# Patient Record
Sex: FEMALE | Race: BLACK OR AFRICAN AMERICAN | NOT HISPANIC OR LATINO | Employment: UNEMPLOYED | URBAN - METROPOLITAN AREA
[De-identification: names, ages, dates, MRNs, and addresses within clinical notes are randomized per-mention and may not be internally consistent; named-entity substitution may affect disease eponyms.]

---

## 2017-02-23 ENCOUNTER — TELEPHONE (OUTPATIENT)
Dept: OBSTETRICS AND GYNECOLOGY | Facility: CLINIC | Age: 37
End: 2017-02-23

## 2017-02-23 NOTE — TELEPHONE ENCOUNTER
----- Message from Joann Harris sent at 2/23/2017  3:28 PM CST -----  Refill: nifedipine 30 MG ORAL TR24 (PROCARDIA-XL) 30 MG (OSM) 24 hr tablet     Please send to [facility] on [address]. Patient can be reached at [phone] thank you!      02/23/2017 4:01 PM  Spoke with pt and informed her that  stated that she needs to see her primary care for Procardia . Pt then yelled and stated she just needs him to refll it in then stated again that he will not refill it and she needs too see her primary care, pt stated OK! and hung up.

## 2017-05-12 ENCOUNTER — LAB VISIT (OUTPATIENT)
Dept: LAB | Facility: HOSPITAL | Age: 37
End: 2017-05-12
Attending: FAMILY MEDICINE
Payer: MEDICARE

## 2017-05-12 ENCOUNTER — OFFICE VISIT (OUTPATIENT)
Dept: FAMILY MEDICINE | Facility: CLINIC | Age: 37
End: 2017-05-12
Payer: MEDICARE

## 2017-05-12 VITALS
SYSTOLIC BLOOD PRESSURE: 130 MMHG | TEMPERATURE: 99 F | HEART RATE: 105 BPM | DIASTOLIC BLOOD PRESSURE: 90 MMHG | OXYGEN SATURATION: 98 % | WEIGHT: 248 LBS | RESPIRATION RATE: 16 BRPM | BODY MASS INDEX: 42.34 KG/M2 | HEIGHT: 64 IN

## 2017-05-12 DIAGNOSIS — B30.9 VIRAL CONJUNCTIVITIS: ICD-10-CM

## 2017-05-12 DIAGNOSIS — F25.1 SCHIZOAFFECTIVE DISORDER, DEPRESSIVE TYPE: ICD-10-CM

## 2017-05-12 DIAGNOSIS — E66.01 MORBID OBESITY WITH BMI OF 40.0-44.9, ADULT: ICD-10-CM

## 2017-05-12 DIAGNOSIS — I10 UNCONTROLLED HYPERTENSION: ICD-10-CM

## 2017-05-12 DIAGNOSIS — I10 UNCONTROLLED HYPERTENSION: Primary | ICD-10-CM

## 2017-05-12 LAB
ALBUMIN SERPL BCP-MCNC: 4 G/DL
ALP SERPL-CCNC: 72 U/L
ALT SERPL W/O P-5'-P-CCNC: 12 U/L
ANION GAP SERPL CALC-SCNC: 12 MMOL/L
AST SERPL-CCNC: 16 U/L
BASOPHILS # BLD AUTO: 0.02 K/UL
BASOPHILS NFR BLD: 0.3 %
BILIRUB SERPL-MCNC: 0.5 MG/DL
BUN SERPL-MCNC: 15 MG/DL
CALCIUM SERPL-MCNC: 9.7 MG/DL
CHLORIDE SERPL-SCNC: 103 MMOL/L
CHOLEST/HDLC SERPL: 2.6 {RATIO}
CO2 SERPL-SCNC: 23 MMOL/L
CREAT SERPL-MCNC: 0.9 MG/DL
DIFFERENTIAL METHOD: ABNORMAL
EOSINOPHIL # BLD AUTO: 0.1 K/UL
EOSINOPHIL NFR BLD: 1.6 %
ERYTHROCYTE [DISTWIDTH] IN BLOOD BY AUTOMATED COUNT: 13.2 %
EST. GFR  (AFRICAN AMERICAN): >60 ML/MIN/1.73 M^2
EST. GFR  (NON AFRICAN AMERICAN): >60 ML/MIN/1.73 M^2
GLUCOSE SERPL-MCNC: 72 MG/DL
HCT VFR BLD AUTO: 42.4 %
HDL/CHOLESTEROL RATIO: 37.8 %
HDLC SERPL-MCNC: 193 MG/DL
HDLC SERPL-MCNC: 73 MG/DL
HGB BLD-MCNC: 13.9 G/DL
LDLC SERPL CALC-MCNC: 107.4 MG/DL
LYMPHOCYTES # BLD AUTO: 2 K/UL
LYMPHOCYTES NFR BLD: 30.5 %
MCH RBC QN AUTO: 30.9 PG
MCHC RBC AUTO-ENTMCNC: 32.8 %
MCV RBC AUTO: 94 FL
MONOCYTES # BLD AUTO: 0.2 K/UL
MONOCYTES NFR BLD: 3.7 %
NEUTROPHILS # BLD AUTO: 4.1 K/UL
NEUTROPHILS NFR BLD: 63.7 %
NONHDLC SERPL-MCNC: 120 MG/DL
PLATELET # BLD AUTO: 272 K/UL
PMV BLD AUTO: 10.4 FL
POTASSIUM SERPL-SCNC: 4 MMOL/L
PROT SERPL-MCNC: 7.9 G/DL
RBC # BLD AUTO: 4.5 M/UL
SODIUM SERPL-SCNC: 138 MMOL/L
TRIGL SERPL-MCNC: 63 MG/DL
WBC # BLD AUTO: 6.43 K/UL

## 2017-05-12 PROCEDURE — 36415 COLL VENOUS BLD VENIPUNCTURE: CPT | Mod: PO

## 2017-05-12 PROCEDURE — 80053 COMPREHEN METABOLIC PANEL: CPT

## 2017-05-12 PROCEDURE — 85025 COMPLETE CBC W/AUTO DIFF WBC: CPT

## 2017-05-12 PROCEDURE — 99214 OFFICE O/P EST MOD 30 MIN: CPT | Mod: S$PBB,,, | Performed by: FAMILY MEDICINE

## 2017-05-12 PROCEDURE — 99999 PR PBB SHADOW E&M-EST. PATIENT-LVL III: CPT | Mod: PBBFAC,,, | Performed by: FAMILY MEDICINE

## 2017-05-12 PROCEDURE — 80061 LIPID PANEL: CPT

## 2017-05-12 PROCEDURE — 99499 UNLISTED E&M SERVICE: CPT | Mod: S$PBB,,, | Performed by: FAMILY MEDICINE

## 2017-05-12 RX ORDER — DOCUSATE SODIUM 100 MG/1
100 CAPSULE, LIQUID FILLED ORAL 2 TIMES DAILY PRN
Qty: 60 CAPSULE | Refills: 0 | Status: CANCELLED | OUTPATIENT
Start: 2017-05-12 | End: 2017-08-10

## 2017-05-12 RX ORDER — NIFEDIPINE 30 MG/1
30 TABLET, EXTENDED RELEASE ORAL DAILY
Qty: 90 TABLET | Refills: 0 | Status: SHIPPED | OUTPATIENT
Start: 2017-05-12 | End: 2017-05-24 | Stop reason: SDUPTHER

## 2017-05-12 NOTE — PROGRESS NOTES
Subjective:       Patient ID: Ester Guillen is a 36 y.o. female.    Chief Complaint: Hypertension (follow up ) and Eye Problem (left eye redness)    Hypertension   This is a new problem. The current episode started more than 1 month ago. The problem is unchanged. The problem is controlled. Associated symptoms include chest pain. Pertinent negatives include no anxiety, blurred vision, headaches, malaise/fatigue, neck pain, orthopnea, palpitations, peripheral edema, PND, shortness of breath or sweats. There are no associated agents to hypertension. Risk factors for coronary artery disease include sedentary lifestyle. Past treatments include nothing. The current treatment provides no improvement. There are no compliance problems.    Patient with gestational HTN.  Took nifedipine x 1 month.  Also with redness of right eye x several days.  No eye pain or drainage.  URI 1 month ago.  Review of Systems   Constitutional: Negative for malaise/fatigue.   Eyes: Negative for blurred vision, photophobia, pain, discharge, itching and visual disturbance.   Respiratory: Negative for shortness of breath.    Cardiovascular: Positive for chest pain. Negative for palpitations, orthopnea and PND.   Musculoskeletal: Negative for neck pain.   Neurological: Negative for headaches.       Objective:    /90  Physical Exam   Constitutional: She is oriented to person, place, and time. She appears well-developed and well-nourished.   Eyes: Right eye exhibits no discharge. Right conjunctiva is injected. No scleral icterus.   Sclera injected   Neck: Normal range of motion. Neck supple. No thyromegaly present.   Cardiovascular: Normal rate and regular rhythm.  Exam reveals no gallop and no friction rub.    No murmur heard.  Pulmonary/Chest: Effort normal and breath sounds normal. She has no wheezes. She has no rales.   Abdominal: Soft. Bowel sounds are normal. She exhibits no distension and no mass. There is no hepatosplenomegaly.  There is no tenderness.   Musculoskeletal: She exhibits no edema.   Lymphadenopathy:     She has no cervical adenopathy.     She has no axillary adenopathy.        Right: No supraclavicular adenopathy present.        Left: No supraclavicular adenopathy present.   Neurological: She is alert and oriented to person, place, and time.   Skin: Skin is warm and dry. No rash noted.   Psychiatric: She has a normal mood and affect. Her behavior is normal.         Assessment:       1. Uncontrolled hypertension    2. Schizoaffective disorder, depressive type    3. Morbid obesity with BMI of 40.0-44.9, adult    4. Viral conjunctivitis        Plan:       Uncontrolled hypertension  BP borderline  -    resume nifedipine (PROCARDIA-XL) 30 MG (OSM) 24 hr tablet; Take 1 tablet (30 mg total) by mouth once daily.  Dispense: 90 tablet; Refill: 0  -     Comprehensive metabolic panel; Future; Expected date: 5/12/17  -     Lipid panel; Future; Expected date: 5/12/17  -     CBC auto differential; Future; Expected date: 5/12/17    Schizoaffective disorder, depressive type  Followed by psychiatry, on abilify.  Mood stable    Morbid obesity with BMI of 40.0-44.9, adult  -     Encouraged diet and exercise    Viral conjunctivitis  Patient reassured.  Advised to follow up if eye pain, drainage or changes in vision occurs          Return in about 4 weeks (around 6/9/2017).

## 2017-05-12 NOTE — PROGRESS NOTES
Answers for HPI/ROS submitted by the patient on 5/10/2017   Hypertension  Chronicity: new  Onset: more than 1 month ago  Progression since onset: unchanged  Condition status: controlled  anxiety: No  blurred vision: No  chest pain: Yes  headaches: No  malaise/fatigue: No  neck pain: No  orthopnea: No  palpitations: No  peripheral edema: No  PND: No  shortness of breath: No  sweats: No  Agents associated with hypertension: no associated agents  CAD risks: sedentary lifestyle  Compliance problems: no compliance problems  Past treatments: nothing  Improvement on treatment: no improvement

## 2017-05-12 NOTE — MR AVS SNAPSHOT
[facility]  [address]  [place] [address]  Phone: [phone]  Fax: [phone]                  Ester Guillen   2017 10:00 AM   Office Visit    Description:  Female : 1980   Provider:  Chitra Gama MD   Department:  [facility]           Reason for Visit     Hypertension     Eye Problem           Diagnoses this Visit        Comments    Uncontrolled hypertension    -  Primary     Schizoaffective disorder, depressive type         Morbid obesity with BMI of 40.0-44.9, adult         Viral conjunctivitis                To Do List           Future Appointments        Provider Department Dept Phone    2017 11:00 AM LAB, [facility] [phone]    2017 10:20 AM Chitra Gama MD [facility] [phone]      Goals (5 Years of Data)     None      Follow-Up and Disposition     Return in about 4 weeks (around 2017).    Follow-up and Disposition History       These Medications        Disp Refills Start End    nifedipine (PROCARDIA-XL) 30 MG (OSM) 24 hr tablet 90 tablet 0 2017 8/10/2017    Take 1 tablet (30 mg total) by mouth once daily. - Oral    Pharmacy: [facility] [address] [place] [address] Ph #: [phone]         [facility]     [facility] Nurse Care Line -  Assistance  Unless otherwise directed by your provider, please contact [facility], our nurse care line that is available for  assistance.     Registered nurses in the [facility] provide: appointment scheduling, clinical advisement, health education, and other advisory services.  Call: [phone] (toll free)               Medications           Message regarding Medications     Verify the changes and/or additions to your medication regime listed below are the same as discussed with your clinician today.  If any of these changes or additions are incorrect, please  "notify your healthcare provider.        CHANGE how you are taking these medications     Start Taking Instead of    nifedipine (PROCARDIA-XL) 30 MG (OSM) 24 hr tablet nifedipine 30 MG ORAL TR24 (PROCARDIA-XL) 30 MG (OSM) 24 hr tablet    Dosage:  Take 1 tablet (30 mg total) by mouth once daily. Dosage:  Take 1 tablet (30 mg total) by mouth once daily.    Reason for Change:  Reorder            Verify that the below list of medications is an accurate representation of the medications you are currently taking.  If none reported, the list may be blank. If incorrect, please contact your healthcare provider. Carry this list with you in case of emergency.           Current Medications     aripiprazole (ABILIFY) 15 MG Tab Take 15 mg by mouth every evening.     nifedipine (PROCARDIA-XL) 30 MG (OSM) 24 hr tablet Take 1 tablet (30 mg total) by mouth once daily.    docusate sodium (COLACE) 100 MG capsule Take 1 capsule (100 mg total) by mouth 2 (two) times daily as needed for Constipation.    ferrous gluconate (FERGON) 325 MG Tab Take 1 tablet (325 mg total) by mouth daily with breakfast.           Clinical Reference Information           Your Vitals Were     BP Pulse Temp Resp Height Weight    130/90 (BP Location: Right arm, Patient Position: Sitting, BP Method: Manual) 105 98.7 °F (37.1 °C) (Oral) 16 5' 4" (1.626 m) 112.5 kg (248 lb 0.3 oz)    Last Period SpO2 BMI          04/29/2017 (Exact Date) 98% 42.57 kg/m2        Blood Pressure          Most Recent Value    BP  (!)  130/90      Allergies as of 5/12/2017     No Known Allergies      Immunizations Administered on Date of Encounter - 5/12/2017     None      Orders Placed During Today's Visit     Future Labs/Procedures Expected by Expires    CBC auto differential  5/12/2017 5/12/2018    Comprehensive metabolic panel  5/12/2017 5/12/2018    Lipid panel  5/12/2017 5/12/2018      Smoking Cessation     If you would like to quit smoking:   You may be eligible for free services if " you are a [place] resident and started smoking cigarettes before September 1, 1988.  Call the Smoking Cessation [employer] (SCT) toll free at (820) 569-5462 or (879) 007-8437.   Call 1-800-QUIT-NOW if you do not meet the above criteria.   Contact us via email: [email or web address]   View our website for more information: [email or web address]        Language Assistance Services     ATTENTION: Language assistance services are available, free of charge. Please call [phone].      ATENCIÓN: Si habla español, tiene a weiner disposición servicios gratuitos de asistencia lingüística. Llame al [phone].     CHÚ Ý: N?u b?n nói Ti?ng Vi?t, có các d?ch v? h? tr? ngôn ng? mi?n phí dành cho b?n. G?i s? [phone].         [facility] complies with applicable Federal civil rights laws and does not discriminate on the basis of race, color, national origin, age, disability, or sex.

## 2017-05-24 DIAGNOSIS — I10 UNCONTROLLED HYPERTENSION: ICD-10-CM

## 2017-05-24 RX ORDER — NIFEDIPINE 30 MG/1
30 TABLET, EXTENDED RELEASE ORAL DAILY
Qty: 90 TABLET | Refills: 0 | Status: SHIPPED | OUTPATIENT
Start: 2017-05-24 | End: 2017-05-25 | Stop reason: CLARIF

## 2017-05-24 NOTE — TELEPHONE ENCOUNTER
----- Message from Shweta Jeronimo sent at 5/24/2017  2:24 PM CDT -----  Contact: Self  Pt requesting to speak to a nurse regarding meds. Please call [phone]

## 2017-05-25 ENCOUNTER — TELEPHONE (OUTPATIENT)
Dept: FAMILY MEDICINE | Facility: CLINIC | Age: 37
End: 2017-05-25

## 2017-05-25 RX ORDER — AMLODIPINE BESYLATE 2.5 MG/1
2.5 TABLET ORAL DAILY
Qty: 90 TABLET | Refills: 3 | Status: SHIPPED | OUTPATIENT
Start: 2017-05-25 | End: 2018-03-28 | Stop reason: SDUPTHER

## 2017-05-25 NOTE — TELEPHONE ENCOUNTER
----- Message from [facility] JOSE Cain sent at 5/25/2017  2:04 PM CDT -----  Contact: self   The office need to contact the pharmacy for a pre-authorization on nifedipine (PROCARDIA-XL) 30 MG (OSM) 24 hr tablet and stool softener. Please call [phone] Annika. Thanks!

## 2017-05-25 NOTE — TELEPHONE ENCOUNTER
Called pharmacy and was told nifedipine needed PA; I attempted to complete online and received message stating medication available without prior authorization; I called and informed pharmacist, pharmacist called insurance and was told medication is not on their formulary, needs to be changed to a different medication; please send to [facility]

## 2017-06-09 ENCOUNTER — OFFICE VISIT (OUTPATIENT)
Dept: FAMILY MEDICINE | Facility: CLINIC | Age: 37
End: 2017-06-09
Payer: MEDICARE

## 2017-06-09 VITALS
HEIGHT: 64 IN | WEIGHT: 249.13 LBS | DIASTOLIC BLOOD PRESSURE: 76 MMHG | OXYGEN SATURATION: 98 % | SYSTOLIC BLOOD PRESSURE: 132 MMHG | RESPIRATION RATE: 12 BRPM | TEMPERATURE: 99 F | BODY MASS INDEX: 42.53 KG/M2 | HEART RATE: 75 BPM

## 2017-06-09 DIAGNOSIS — H10.31 ACUTE BACTERIAL CONJUNCTIVITIS OF RIGHT EYE: ICD-10-CM

## 2017-06-09 DIAGNOSIS — E66.01 MORBID OBESITY WITH BMI OF 40.0-44.9, ADULT: ICD-10-CM

## 2017-06-09 DIAGNOSIS — I10 ESSENTIAL HYPERTENSION, BENIGN: Primary | ICD-10-CM

## 2017-06-09 PROCEDURE — 99499 UNLISTED E&M SERVICE: CPT | Mod: S$PBB,,, | Performed by: FAMILY MEDICINE

## 2017-06-09 PROCEDURE — 99213 OFFICE O/P EST LOW 20 MIN: CPT | Mod: PBBFAC,PO | Performed by: FAMILY MEDICINE

## 2017-06-09 PROCEDURE — 99999 PR PBB SHADOW E&M-EST. PATIENT-LVL III: CPT | Mod: PBBFAC,,, | Performed by: FAMILY MEDICINE

## 2017-06-09 PROCEDURE — 99213 OFFICE O/P EST LOW 20 MIN: CPT | Mod: S$PBB,,, | Performed by: FAMILY MEDICINE

## 2017-06-09 RX ORDER — POLYMYXIN B SULFATE AND TRIMETHOPRIM 1; 10000 MG/ML; [USP'U]/ML
1 SOLUTION OPHTHALMIC EVERY 4 HOURS
Qty: 1 BOTTLE | Refills: 0 | Status: SHIPPED | OUTPATIENT
Start: 2017-06-09

## 2017-06-09 NOTE — PROGRESS NOTES
Subjective:       Patient ID: Ester Guillen is a 36 y.o. female.    Chief Complaint: Hypertension    HPI:  Here for follow up uncontrolled HTN.  Doing well on amlodipine.  Continues to complain of redness to right eye.  States it began after cleaning contacts.    Review of Systems   Eyes: Positive for redness. Negative for photophobia, pain, discharge, itching and visual disturbance.       Objective:    /80  Physical Exam   Constitutional: She appears well-developed and well-nourished.   Eyes: Right eye exhibits no discharge.   Redness of right sclera         Assessment:       1. Essential hypertension, benign    2. Acute bacterial conjunctivitis of right eye    3. Morbid obesity with BMI of 40.0-44.9, adult        Plan:       Essential hypertension, benign  BP stable.  Continue amlodipine 2.5 mg daily.    Acute bacterial conjunctivitis of right eye  -     polymyxin B sulf-trimethoprim (POLYTRIM) 10,000 unit- 1 mg/mL Drop; Place 1 drop into the right eye every 4 (four) hours. While awake  Dispense: 1 Bottle; Refill: 0    Morbid obesity with BMI of 40.0-44.9, adult  Encourage weight loss      Return in about 3 days (around 6/12/2017), or if ocular symptoms worsen or fail to improve.

## 2018-03-28 RX ORDER — AMLODIPINE BESYLATE 2.5 MG/1
TABLET ORAL
Qty: 90 TABLET | Refills: 0 | Status: SHIPPED | OUTPATIENT
Start: 2018-03-28 | End: 2018-07-19 | Stop reason: SDUPTHER

## 2018-07-19 RX ORDER — AMLODIPINE BESYLATE 2.5 MG/1
TABLET ORAL
Qty: 90 TABLET | Refills: 0 | Status: SHIPPED | OUTPATIENT
Start: 2018-07-19

## 2018-07-26 ENCOUNTER — PATIENT MESSAGE (OUTPATIENT)
Dept: FAMILY MEDICINE | Facility: CLINIC | Age: 38
End: 2018-07-26

## 2018-07-26 ENCOUNTER — TELEPHONE (OUTPATIENT)
Dept: FAMILY MEDICINE | Facility: CLINIC | Age: 38
End: 2018-07-26

## 2018-07-27 NOTE — TELEPHONE ENCOUNTER
----- Message from Cassy Ledbetter MA sent at 7/26/2018  8:32 AM CDT -----  Contact: Self/[phone]      ----- Message -----  From: Mykel Lr  Sent: 7/26/2018   8:28 AM  To: Natan KEITA Staff    Patient stated that she's moving to [place]. She would like Dr. Gama to recommend a PCP in [place]. Thank you.

## 2018-07-27 NOTE — TELEPHONE ENCOUNTER
Inform patient I don't know of a particular doctor, but the [facility] a reputable [facility].

## 2019-01-21 ENCOUNTER — PES CALL (OUTPATIENT)
Dept: ADMINISTRATIVE | Facility: CLINIC | Age: 39
End: 2019-01-21

## 2019-05-16 ENCOUNTER — PES CALL (OUTPATIENT)
Dept: ADMINISTRATIVE | Facility: CLINIC | Age: 39
End: 2019-05-16

## 2022-06-25 ENCOUNTER — HOSPITAL ENCOUNTER (EMERGENCY)
Facility: HOSPITAL | Age: 42
Discharge: HOME | End: 2022-06-25
Attending: EMERGENCY MEDICINE
Payer: MEDICARE

## 2022-06-25 VITALS
DIASTOLIC BLOOD PRESSURE: 67 MMHG | RESPIRATION RATE: 18 BRPM | BODY MASS INDEX: 38.41 KG/M2 | OXYGEN SATURATION: 98 % | SYSTOLIC BLOOD PRESSURE: 141 MMHG | HEART RATE: 78 BPM | TEMPERATURE: 99 F | HEIGHT: 64 IN | WEIGHT: 225 LBS

## 2022-06-25 DIAGNOSIS — F25.9 SCHIZOAFFECTIVE DISORDER, UNSPECIFIED TYPE: ICD-10-CM

## 2022-06-25 DIAGNOSIS — Z76.0 MEDICATION REFILL: Primary | ICD-10-CM

## 2022-06-25 PROCEDURE — 99283 EMERGENCY DEPT VISIT LOW MDM: CPT

## 2022-06-25 RX ORDER — ARIPIPRAZOLE 20 MG/1
20 TABLET ORAL DAILY
COMMUNITY

## 2022-06-25 RX ORDER — ARIPIPRAZOLE 20 MG/1
20 TABLET ORAL DAILY
Qty: 30 TABLET | Refills: 0 | Status: SHIPPED | OUTPATIENT
Start: 2022-06-25 | End: 2022-07-25

## 2022-06-26 NOTE — DISCHARGE INSTRUCTIONS
§ Please return to the Emergency Department for any new or worsening symptoms including: fever, chest pain, shortness of breath, loss of consciousness, dizziness, weakness, thoughts of harming yourself or others or any other concerns.     § Schedule an appointment for follow up with  primary care doctor or psychiatrist  as soon as possible for a recheck of your symptoms. If you do not have one, contact the one listed on your discharge paperwork or call the [facility] Appointment Desk at [phone] to schedule an appointment.     § If you require follow up care from a specialist and are unable to schedule an appointment with them directly, please contact your Primary Care Provider on the next business day to set up a referral.      § Please take all medication as prescribed.

## 2022-06-26 NOTE — ED PROVIDER NOTES
Encounter Date: 2022       History     Chief Complaint   Patient presents with    Medication Refill     Pt from out of town and reports has been out of psych medication x 2 days and is requesting a refill. Apriprazole 20mg. Pt denies medical complaints      CC: Medication Refill    HPI: Ester Guillen, a 41 y.o. female presents to the ED refill of her Abilify.  She is traveling from [place].  She lived here previously and then moved to [place].  She is out of her Abilify 20 mg which she takes once a day.  She has been on this for many years with no adverse side effects.  She reports no recent dose changes.  She reports no current symptoms related to medication.  She is followed by Psychiatry, Dr. Guido Tavera in [place].     Patient Active Problem List:     Schizoaffective disorder, depressive type     Pregnancy with one fetus     AMA (advanced maternal age) multigravida 35+     Previous  delivery affecting pregnancy     S/P  section     Admission for sterilization      The history is provided by the patient. No  was used.     Review of patient's allergies indicates:  No Known Allergies  Past Medical History:   Diagnosis Date    Anemia     Depression     schizoaffective disorder    Encounter for blood transfusion     Schizoaffective disorder      Past Surgical History:   Procedure Laterality Date     SECTION      TUBAL LIGATION       Family History   Problem Relation Age of Onset    Hypertension Mother      Social History     Tobacco Use    Smoking status: Light Tobacco Smoker   Substance Use Topics    Alcohol use: No     Alcohol/week: 0.0 standard drinks     Review of Systems   Constitutional: Negative for fever.   HENT: Positive for congestion.         (+) Requesting Medication Refill   Gastrointestinal: Negative for vomiting.   Skin: Negative for rash and wound.   Neurological: Negative for syncope, speech difficulty and weakness.    Psychiatric/Behavioral: Negative for behavioral problems, confusion and self-injury. The patient is not nervous/anxious.        Physical Exam     Initial Vitals [06/25/22 1858]   BP Pulse Resp Temp SpO2   (!) 141/67 78 18 98.5 °F (36.9 °C) 98 %      MAP       --         Physical Exam    Nursing note and vitals reviewed.  Constitutional: She appears well-developed and well-nourished. She is not diaphoretic. No distress.   HENT:   Head: Normocephalic and atraumatic.   Right Ear: External ear normal.   Left Ear: External ear normal.   Cardiovascular: Normal rate and regular rhythm.   Pulmonary/Chest: No respiratory distress. She has no wheezes. She has no rhonchi. She has no rales.   Musculoskeletal:         General: Normal range of motion.     Neurological: She is alert and oriented to person, place, and time. She has normal strength. GCS score is 15. GCS eye subscore is 4. GCS verbal subscore is 5. GCS motor subscore is 6.   Skin: Skin is warm.   Psychiatric: She has a normal mood and affect. Her speech is normal and behavior is normal. Judgment and thought content normal.         ED Course   Procedures  Labs Reviewed - No data to display       Imaging Results    None          Medications - No data to display        APC / Resident Notes:   This is an evaluation of a 41 y.o. female that presents to the Emergency Department for refill of her Abilify.  No symptoms related to medication or medication withdrawal. Physical Exam shows a non-toxic, afebrile, and well appearing female.  Breath sounds clear to auscultation.  Answering questions appropriately.  No distress. Vital signs are reassuring. If available, previous records reviewed.     My overall impression is refill of medications for schizoaffective disorder. I considered, but at this time, do not suspect acute psychosis requiring admission or medication withdrawal.    Discharge Meds/Instructions:  Will refill 30 days, she is to return to [place] on July 5th.   Encouraged her to follow up with her psychiatrist or return to the ED for any mental health concerns in the interim. The diagnosis, treatment plan, instructions for follow-up as well as ED return precautions were discussed. All questions have been answered. YOAN Mccabe, KELSEY                 Clinical Impression:   Final diagnoses:  [Z76.0] Medication refill (Primary)  [F25.9] Schizoaffective disorder, unspecified type          ED Disposition Condition    Discharge Stable        ED Prescriptions     Medication Sig Dispense Start Date End Date Auth. Provider    ARIPiprazole (ABILIFY) 20 MG Tab Take 1 tablet (20 mg total) by mouth once daily. 30 tablet 6/25/2022 7/25/2022 MENG Walker        Follow-up Information     Follow up With Specialties Details Why Contact Info    Your psychiatrist  Call  To discuss your ED visit & schedule follow-up     [facility] Emergency Dept Emergency Medicine Go to  If symptoms worsen [id number]  [facility] [id number] [phone]           MENG Walker  06/25/22 2009